# Patient Record
Sex: MALE | Race: NATIVE HAWAIIAN OR OTHER PACIFIC ISLANDER | HISPANIC OR LATINO | Employment: FULL TIME | URBAN - METROPOLITAN AREA
[De-identification: names, ages, dates, MRNs, and addresses within clinical notes are randomized per-mention and may not be internally consistent; named-entity substitution may affect disease eponyms.]

---
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
## 2019-09-10 ENCOUNTER — OFFICE VISIT (OUTPATIENT)
Dept: URGENT CARE | Facility: PHYSICIAN GROUP | Age: 19
End: 2019-09-10
Payer: COMMERCIAL

## 2019-09-10 ENCOUNTER — APPOINTMENT (OUTPATIENT)
Dept: RADIOLOGY | Facility: IMAGING CENTER | Age: 19
End: 2019-09-10
Attending: PHYSICIAN ASSISTANT
Payer: COMMERCIAL

## 2019-09-10 VITALS
HEIGHT: 72 IN | BODY MASS INDEX: 22.21 KG/M2 | TEMPERATURE: 99.2 F | SYSTOLIC BLOOD PRESSURE: 116 MMHG | DIASTOLIC BLOOD PRESSURE: 80 MMHG | RESPIRATION RATE: 16 BRPM | OXYGEN SATURATION: 99 % | HEART RATE: 86 BPM | WEIGHT: 164 LBS

## 2019-09-10 DIAGNOSIS — K59.00 CONSTIPATION, UNSPECIFIED CONSTIPATION TYPE: ICD-10-CM

## 2019-09-10 DIAGNOSIS — R06.02 SOB (SHORTNESS OF BREATH): ICD-10-CM

## 2019-09-10 PROCEDURE — 74019 RADEX ABDOMEN 2 VIEWS: CPT | Mod: TC | Performed by: PHYSICIAN ASSISTANT

## 2019-09-10 PROCEDURE — 99204 OFFICE O/P NEW MOD 45 MIN: CPT | Performed by: PHYSICIAN ASSISTANT

## 2019-09-10 PROCEDURE — 71046 X-RAY EXAM CHEST 2 VIEWS: CPT | Mod: TC | Performed by: PHYSICIAN ASSISTANT

## 2019-09-10 SDOH — HEALTH STABILITY: MENTAL HEALTH: HOW OFTEN DO YOU HAVE 6 OR MORE DRINKS ON ONE OCCASION?: MONTHLY

## 2019-09-10 SDOH — HEALTH STABILITY: MENTAL HEALTH: HOW MANY STANDARD DRINKS CONTAINING ALCOHOL DO YOU HAVE ON A TYPICAL DAY?: 3 OR 4

## 2019-09-10 SDOH — HEALTH STABILITY: MENTAL HEALTH: HOW OFTEN DO YOU HAVE A DRINK CONTAINING ALCOHOL?: 2-3 TIMES A WEEK

## 2019-09-10 NOTE — PROGRESS NOTES
"Chief Complaint   Patient presents with   • Arm Pain     left arm weakness, numbness an pain x on an off 2-3 week   • Rapid Heart Beat     used to see a heart dr said he had an irregular heart beat   • Shortness of Breath   • Constipation     this is an on going problem his whole life       HISTORY OF PRESENT ILLNESS: Patient is a 18 y.o. male who presents today for the following:    Feels like his heart is swollen; started about a year ago, went away, started again yesterday  Started while pressure washing the ground; left arm felt tight and numb; \"then I started getting this feeling around my heart\"  H/o \"extra heart beat\", cleared by cardiology 3-4 years ago  Denies rapid/slow heart beat, pounding heart beat  Does occasionally have trouble taking a deep breath but only a couple times/week  Denies cough/fever    Constipation, chronic  Seems to be worse lately  Last BM yesterday; not very much; very small pieces; not painful; felt the urge but couldn't   OTC meds tried: \"laxative\" probably once/week  Has never been evaluated for this  Has had constipation since he was a baby  Denies h/o bleeding, abdominal pain  Does report occasional rectal pain with BM     Patient Active Problem List    Diagnosis Date Noted   • Peritonsillar abscess 01/22/2017       Allergies:Patient has no known allergies.    Current Outpatient Medications Ordered in Epic   Medication Sig Dispense Refill   • amoxicillin-clavulanate (AUGMENTIN) 875-125 MG Tab Take 1 Tab by mouth 2 times a day. (Patient not taking: Reported on 9/10/2019) 20 Tab 0   • predniSONE (DELTASONE) 10 MG Tab 2 pills twice a day for 4 days (Patient not taking: Reported on 9/10/2019) 16 Tab 0   • ibuprofen (MOTRIN) 800 MG Tab Take 800 mg by mouth every 8 hours as needed.       No current Epic-ordered facility-administered medications on file.        Past Medical History:   Diagnosis Date   • Irregular heart rhythm        Social History     Tobacco Use   • Smoking status: " Never Smoker   • Smokeless tobacco: Current User     Types: Chew   Substance Use Topics   • Alcohol use: Yes     Frequency: 2-3 times a week     Drinks per session: 3 or 4     Binge frequency: Monthly   • Drug use: No       No family status information on file.   No family history on file.    Review of Systems:    Constitutional ROS: No unexpected change in weight, No weakness, No fatigue  Eye ROS: No recent significant change in vision, No eye pain, redness, discharge  Ear ROS: No drainage, No tinnitus or vertigo, No recent change in hearing  Mouth/Throat ROS: No teeth or gum problems, No bleeding gums, No tongue complaints  Neck ROS: No swollen glands, No significant pain in neck  Pulmonary ROS: Positive for shortness of breath  Cardiovascular ROS: No diaphoresis, No edema, No palpitations  Gastrointestinal ROS: Positive for constipation.  Musculoskeletal/Extremities ROS: No peripheral edema, No pain, redness or swelling on the joints  Hematologic/Lymphatic ROS: No chills, No night sweats, No weight loss  Skin/Integumentary ROS: No edema, No evidence of rash, No itching      Exam:  /80   Pulse 86   Temp 37.3 °C (99.2 °F) (Temporal)   Resp 16   Ht 1.829 m (6')   Wt 74.4 kg (164 lb)   SpO2 99%   General: Well developed, well nourished. No distress.  Pulmonary: Unlabored respiratory effort. Lungs clear to auscultation, no wheezes, no rhonchi.  No friction rub noted.  Cardiovascular: Regular rate and rhythm without murmur.  No chest wall tenderness noted.  No friction rub noted.  Extremities: No motor or sensory deficit noted.  Radial pulses are strong and equal bilaterally.  Neurologic: Grossly nonfocal. No facial asymmetry noted.  Skin: Warm, dry, good turgor. No rashes in visible areas.   Psych: Normal mood. Alert and oriented x3. Judgment and insight is normal.    EKG, per my interpretation: Normal sinus rhythm.  Normal axis.  No ST elevation/depression.    Chest x-ray, per radiology:  Impression        No acute cardiopulmonary disease.     2 view abdomen, per radiology:  Impression       1.  Increased colonic stool suggesting constipation.  2.  No evidence for bowel obstruction.     Assessment/Plan:  Chest x-ray is negative.  Suspect patient may have had a muscle strain with some possible nerve impingement causing his symptoms.  They seem to be improving.    Discussed acute versus daily medication for constipation.  Discussed dietary changes, increasing fruits and vegetables and whole grains.  Discussed importance of drinking plenty fluids and getting daily activity.  Patient verbalizes understanding and agrees to try this.  Advised he may need to see gastroenterology if symptoms do not improve.  1. Constipation, unspecified constipation type  [id number] VIEWS   2. SOB (shortness of breath)  DX-CHEST-2 VIEWS

## 2019-09-10 NOTE — LETTER
September 10, 2019         Patient: Wallace Larose   YOB: 2000   Date of Visit: 9/10/2019           To Whom it May Concern:    Wallace Larose was seen in my clinic on 9/10/2019. He may return to work on 9/11/19 without restrictions.    If you have any questions or concerns, please don't hesitate to call.        Sincerely,           Zakia Renee P.A.-C.  Electronically Signed

## 2019-09-10 NOTE — PATIENT INSTRUCTIONS
Estreñimiento - Adultos  (Constipation, Adult)  Se llama constipación cuando:  · Elimina heces (defeca) menos de 3 veces por semana.  · Tiene dificultad para defecar.  · Las heces son secas y duras o son más grandes que lo normal.  CUIDADOS EN EL HOGAR   · Consuma alimentos con alto contenido de fibra. Por ejemplo, frutas, vegetales, porotos y cereales integrales, guru el arroz integral.  · Evite los alimentos ricos en grasas y azúcar. Estos incluyen patatas fritas, hamburguesas, galletas, dulces y refrescos.  · Si no consume suficientes alimentos ricos en fibras, tome productos que tengan agregado de fibra (suplementos).  · Denia suficiente líquido para mantener el pis (orina) mendy o de color amarillo pálido.  · Lou ejercicio en forma regular, o guru lo indique ray médico.  · Vaya al baño cuando sienta la necesidad de defecar. No se aguante las ganas.  · Solo tome los medicamentos que le haya indicado ray médico. No tome medicamentos que le ayuden a defecar (laxantes) sin antes consultarlo con ray médico.  SOLICITE AYUDA DE INMEDIATO SI:   · Observa ian brillante en las heces (materia fecal).  · El estreñimiento dura más de 4 días o empeora.  · Tiene dolor en el vientre (abdominal) o el trasero (recto).  · Las heces son delgadas (guru un lápiz).  · Pierde peso de manera inexplicable.  ASEGÚRESE DE QUE:   · Comprende estas instrucciones.  · Controlará ray afección.  · Recibirá ayuda de inmediato si no mejora o si empeora.  Esta información no tiene guru fin reemplazar el consejo del médico. Asegúrese de hacerle al médico cualquier pregunta que tenga.  Document Released: 01/20/2012 Document Revised: 01/08/2016  [employer] Interactive Patient Education © 2017 [employer]  Constipation, Adult  Constipation is when a person:  · Poops (has a bowel movement) fewer times in a week than normal.  · Has a hard time pooping.  · Has poop that is dry, hard, or bigger than normal.  Follow these instructions at home:  Eating and  drinking  · Eat foods that have a lot of fiber, such as:  ¨ Fresh fruits and vegetables.  ¨ Whole grains.  ¨ Beans.  · Eat less of foods that are high in fat, low in fiber, or overly processed, such as:  ¨ French fries.  ¨ Hamburgers.  ¨ Cookies.  ¨ Candy.  ¨ Soda.  · Drink enough fluid to keep your pee (urine) clear or pale yellow.  General instructions  · Exercise regularly or as told by your doctor.  · Go to the restroom when you feel like you need to poop. Do not hold it in.  · Take over-the-counter and prescription medicines only as told by your doctor. These include any fiber supplements.  · Do pelvic floor retraining exercises, such as:  ¨ Doing deep breathing while relaxing your lower belly (abdomen).  ¨ Relaxing your pelvic floor while pooping.  · Watch your condition for any changes.  · Keep all follow-up visits as told by your doctor. This is important.  Contact a doctor if:  · You have pain that gets worse.  · You have a fever.  · You have not pooped for 4 days.  · You throw up (vomit).  · You are not hungry.  · You lose weight.  · You are bleeding from the anus.  · You have thin, pencil-like poop (stool).  Get help right away if:  · You have a fever, and your symptoms suddenly get worse.  · You leak poop or have blood in your poop.  · Your belly feels hard or bigger than normal (is bloated).  · You have very bad belly pain.  · You feel dizzy or you faint.  This information is not intended to replace advice given to you by your health care provider. Make sure you discuss any questions you have with your health care provider.  Document Released: 06/05/2009 Document Revised: 07/07/2017 Document Reviewed: 06/07/2017  [employer]vier Interactive Patient Education © 2017 [employer]

## 2020-01-06 ENCOUNTER — OFFICE VISIT (OUTPATIENT)
Dept: URGENT CARE | Facility: PHYSICIAN GROUP | Age: 20
End: 2020-01-06
Payer: COMMERCIAL

## 2020-01-06 VITALS
BODY MASS INDEX: 22.78 KG/M2 | RESPIRATION RATE: 18 BRPM | HEART RATE: 76 BPM | SYSTOLIC BLOOD PRESSURE: 126 MMHG | TEMPERATURE: 97.8 F | OXYGEN SATURATION: 98 % | DIASTOLIC BLOOD PRESSURE: 80 MMHG | WEIGHT: 168 LBS

## 2020-01-06 DIAGNOSIS — J22 LOWER RESP. TRACT INFECTION: ICD-10-CM

## 2020-01-06 PROCEDURE — 99214 OFFICE O/P EST MOD 30 MIN: CPT | Performed by: PHYSICIAN ASSISTANT

## 2020-01-06 RX ORDER — AZITHROMYCIN 250 MG/1
TABLET, FILM COATED ORAL
Qty: 6 TAB | Refills: 0 | Status: SHIPPED | OUTPATIENT
Start: 2020-01-06

## 2020-01-06 NOTE — PROGRESS NOTES
Chief Complaint   Patient presents with   • Cough     x6d        HISTORY OF PRESENT ILLNESS: Patient is a 19 y.o. male who presents today for the following:    Cough x 6 days  Denies nasal congestion  Mild SOB, chest pain with coughing  Upper back pain  Subjective fever a few days  Denies chills, body aches  OTC meds tried: cough syrup    Patient Active Problem List    Diagnosis Date Noted   • Peritonsillar abscess 01/22/2017       Allergies:Patient has no known allergies.    Current Outpatient Medications Ordered in Epic   Medication Sig Dispense Refill   • azithromycin (ZITHROMAX) 250 MG Tab Use as package directs 6 Tab 0   • amoxicillin-clavulanate (AUGMENTIN) 875-125 MG Tab Take 1 Tab by mouth 2 times a day. (Patient not taking: Reported on 9/10/2019) 20 Tab 0   • predniSONE (DELTASONE) 10 MG Tab 2 pills twice a day for 4 days (Patient not taking: Reported on 9/10/2019) 16 Tab 0   • ibuprofen (MOTRIN) 800 MG Tab Take 800 mg by mouth every 8 hours as needed.       No current Epic-ordered facility-administered medications on file.        Past Medical History:   Diagnosis Date   • Irregular heart rhythm        Social History     Tobacco Use   • Smoking status: Never Smoker   • Smokeless tobacco: Current User     Types: Chew   Substance Use Topics   • Alcohol use: Yes     Frequency: 2-3 times a week     Drinks per session: 3 or 4     Binge frequency: Monthly   • Drug use: No       No family status information on file.   History reviewed. No pertinent family history.    Review of Systems:   Constitutional ROS: No unexpected change in weight, No weakness, No fatigue  Eye ROS: No recent significant change in vision, No eye pain, redness, discharge  Ear ROS: No drainage, No tinnitus or vertigo, No recent change in hearing  Mouth/Throat ROS: No teeth or gum problems, No bleeding gums, No tongue complaints  Neck ROS: No swollen glands, No significant pain in neck  Pulmonary ROS: No chronic cough, sputum, or hemoptysis, No  dyspnea on exertion, No wheezing  Cardiovascular ROS: No diaphoresis, No edema, No palpitations  Musculoskeletal/Extremities ROS: No peripheral edema, No pain, redness or swelling on the joints  Hematologic/Lymphatic ROS: + fever, resolved  Skin/Integumentary ROS: No edema, No evidence of rash, No itching      Exam:  /80   Pulse 76   Temp 36.6 °C (97.8 °F) (Temporal)   Resp 18   Wt 76.2 kg (168 lb)   SpO2 98%   General: Well developed, well nourished. No distress.    Eye: PERRL/EOMI; conjunctivae clear, lids normal.  ENMT: Lips without lesions, MMM. Oropharynx is clear. Bilateral TMs are within normal limits.  Pulmonary: Unlabored respiratory effort. Lungs clear to auscultation, no wheezes, no rhonchi.    Cardiovascular: Regular rate and rhythm without murmur.   Neurologic: Grossly nonfocal. No facial asymmetry noted.  Lymph: No cervical lymphadenopathy noted.  Skin: Warm, dry, good turgor. No rashes in visible areas.   Psych: Normal mood. Alert and oriented to person, place and time.    Assessment/Plan:  Discussed likely viral etiology. Discussed appropriate over-the-counter symptomatic medication, and when to return to clinic.  Patient lives out of town with limited access to healthcare.   Contingent antibiotic prescription given to patient to fill upon meeting criteria of guidelines discussed. Follow up for worsening or persistent symptoms.  1. Lower resp. tract infection  azithromycin (ZITHROMAX) 250 MG Tab

## 2020-02-04 ENCOUNTER — OFFICE VISIT (OUTPATIENT)
Dept: URGENT CARE | Facility: PHYSICIAN GROUP | Age: 20
End: 2020-02-04
Payer: COMMERCIAL

## 2020-02-04 ENCOUNTER — APPOINTMENT (OUTPATIENT)
Dept: RADIOLOGY | Facility: IMAGING CENTER | Age: 20
End: 2020-02-04
Attending: PHYSICIAN ASSISTANT
Payer: COMMERCIAL

## 2020-02-04 VITALS
SYSTOLIC BLOOD PRESSURE: 122 MMHG | RESPIRATION RATE: 16 BRPM | OXYGEN SATURATION: 96 % | HEART RATE: 79 BPM | TEMPERATURE: 98.4 F | DIASTOLIC BLOOD PRESSURE: 84 MMHG | BODY MASS INDEX: 22.6 KG/M2 | HEIGHT: 72 IN | WEIGHT: 166.9 LBS

## 2020-02-04 DIAGNOSIS — R06.02 SOB (SHORTNESS OF BREATH): ICD-10-CM

## 2020-02-04 DIAGNOSIS — J22 LOWER RESP. TRACT INFECTION: ICD-10-CM

## 2020-02-04 PROCEDURE — 99214 OFFICE O/P EST MOD 30 MIN: CPT | Performed by: PHYSICIAN ASSISTANT

## 2020-02-04 RX ORDER — BENZONATATE 200 MG/1
200 CAPSULE ORAL 3 TIMES DAILY PRN
Qty: 30 CAP | Refills: 0 | Status: SHIPPED | OUTPATIENT
Start: 2020-02-04

## 2020-02-04 RX ORDER — CODEINE PHOSPHATE AND GUAIFENESIN 10; 100 MG/5ML; MG/5ML
5 SOLUTION ORAL EVERY 12 HOURS PRN
Qty: 100 ML | Refills: 0 | Status: SHIPPED | OUTPATIENT
Start: 2020-02-04 | End: 2020-02-14

## 2020-02-04 RX ORDER — DOXYCYCLINE HYCLATE 100 MG
100 TABLET ORAL 2 TIMES DAILY
Qty: 20 TAB | Refills: 0 | Status: SHIPPED | OUTPATIENT
Start: 2020-02-04

## 2020-02-04 RX ORDER — METHYLPREDNISOLONE 4 MG/1
TABLET ORAL
Qty: 21 TAB | Refills: 0 | Status: SHIPPED | OUTPATIENT
Start: 2020-02-04

## 2020-02-04 NOTE — LETTER
February 4, 2020         Patient: Wallace Larose   YOB: 2000   Date of Visit: 2/4/2020           To Whom it May Concern:    Wallace Larose was seen in my clinic on 2/4/2020. He may return to work on 2/5/2020.    If you have any questions or concerns, please don't hesitate to call.        Sincerely,           Zakia Renee P.A.-C.  Electronically Signed

## 2020-02-05 NOTE — PROGRESS NOTES
Chief Complaint   Patient presents with   • Cough     over 1 month, was seen on 1-6-2020   • Wheezing   • Shortness of Breath     with the cough       HISTORY OF PRESENT ILLNESS: Patient is a 19 y.o. male who presents today for the following:    Patient comes in for evaluation of persistent cough.  He was seen 1/6/2020 for the same, with symptoms starting over 1 month ago.  He continues to have persistent cough, causing coughing fits.  He has shortness of breath with the coughing fits and has had a difficult time sleeping because of the cough.  He denies history of asthma.  He has had only a mild runny nose but otherwise has not had any sore throat or ear pain.  He has not had any fever, night sweats, chills, body aches.    Patient Active Problem List    Diagnosis Date Noted   • Peritonsillar abscess 01/22/2017       Allergies:Patient has no known allergies.    Current Outpatient Medications Ordered in Epic   Medication Sig Dispense Refill   • doxycycline (VIBRAMYCIN) 100 MG Tab Take 1 Tab by mouth 2 times a day. ANTIBIOTIC 20 Tab 0   • methylPREDNISolone (MEDROL DOSEPAK) 4 MG Tablet Therapy Pack Use as package directs 21 Tab 0   • guaifenesin-codeine (ROBITUSSIN AC) Solution oral solution Take 5 mL by mouth every 12 hours as needed for Cough for up to 10 days. 100 mL 0   • benzonatate (TESSALON) 200 MG capsule Take 1 Cap by mouth 3 times a day as needed for Cough. 30 Cap 0   • azithromycin (ZITHROMAX) 250 MG Tab Use as package directs (Patient not taking: Reported on 2/4/2020) 6 Tab 0   • amoxicillin-clavulanate (AUGMENTIN) 875-125 MG Tab Take 1 Tab by mouth 2 times a day. (Patient not taking: Reported on 9/10/2019) 20 Tab 0   • predniSONE (DELTASONE) 10 MG Tab 2 pills twice a day for 4 days (Patient not taking: Reported on 9/10/2019) 16 Tab 0   • ibuprofen (MOTRIN) 800 MG Tab Take 800 mg by mouth every 8 hours as needed.       No current Epic-ordered facility-administered medications on file.        Past Medical  History:   Diagnosis Date   • Irregular heart rhythm        Social History     Tobacco Use   • Smoking status: Never Smoker   • Smokeless tobacco: Current User     Types: Chew   Substance Use Topics   • Alcohol use: Yes     Frequency: 2-3 times a week     Drinks per session: 3 or 4     Binge frequency: Monthly   • Drug use: No       No family status information on file.   History reviewed. No pertinent family history.    Review of Systems:   Constitutional ROS: No unexpected change in weight, No weakness, No fatigue  Eye ROS: No recent significant change in vision, No eye pain, redness, discharge  Ear ROS: No drainage, No tinnitus or vertigo, No recent change in hearing  Mouth/Throat ROS: No teeth or gum problems, No bleeding gums, No tongue complaints  Neck ROS: No swollen glands, No significant pain in neck  Pulmonary ROS: Positive for shortness of breath.  Cardiovascular ROS: No diaphoresis, No edema, No palpitations  Musculoskeletal/Extremities ROS: No peripheral edema, No pain, redness or swelling on the joints  Hematologic/Lymphatic ROS: No chills, No night sweats, No weight loss  Skin/Integumentary ROS: No edema, No evidence of rash, No itching      Exam:  /84   Pulse 79   Temp 36.9 °C (98.4 °F) (Temporal)   Resp 16   Ht 1.829 m (6')   Wt 75.7 kg (166 lb 14.4 oz)   SpO2 96%   General: Well developed, well nourished. No distress.    Eye: PERRL/EOMI; conjunctivae clear, lids normal.  ENMT: Lips without lesions, MMM. Oropharynx is clear. Bilateral TMs are within normal limits.  Pulmonary: Unlabored respiratory effort. Lungs clear to auscultation, no wheezes, no rhonchi.    Cardiovascular: Regular rate and rhythm without murmur.   Neurologic: Grossly nonfocal. No facial asymmetry noted.  Lymph: No cervical lymphadenopathy noted.  Skin: Warm, dry, good turgor. No rashes in visible areas.   Psych: Normal mood. Alert and oriented to person, place and time.    Assessment/Plan:  Discussed with patient that  this is possibly due to a virus.  We will have patient start steroids for the shortness of breath and have provided contingent antibiotics for any worsening symptoms.  Chest x-ray to be performed as an outpatient if symptoms do not improve with the steroids and antibiotics.  Discussed appropriate over-the-counter symptomatic medication, and when to return to clinic. Follow up for worsening or persistent symptoms.  1. Lower resp. tract infection  methylPREDNISolone (MEDROL DOSEPAK) 4 MG Tablet Therapy Pack    guaifenesin-codeine (ROBITUSSIN AC) Solution oral solution   2. SOB (shortness of breath)  doxycycline (VIBRAMYCIN) 100 MG Tab    DX-CHEST-2 VIEWS    benzonatate (TESSALON) 200 MG capsule

## 2025-01-02 ENCOUNTER — APPOINTMENT (OUTPATIENT)
Dept: RADIOLOGY | Facility: IMAGING CENTER | Age: 25
End: 2025-01-02
Attending: NURSE PRACTITIONER
Payer: COMMERCIAL

## 2025-01-02 ENCOUNTER — OFFICE VISIT (OUTPATIENT)
Dept: URGENT CARE | Facility: PHYSICIAN GROUP | Age: 25
End: 2025-01-02
Payer: COMMERCIAL

## 2025-01-02 VITALS
HEIGHT: 71 IN | SYSTOLIC BLOOD PRESSURE: 126 MMHG | BODY MASS INDEX: 28.87 KG/M2 | HEART RATE: 101 BPM | DIASTOLIC BLOOD PRESSURE: 82 MMHG | WEIGHT: 206.2 LBS | TEMPERATURE: 98.1 F | RESPIRATION RATE: 16 BRPM | OXYGEN SATURATION: 99 %

## 2025-01-02 DIAGNOSIS — R14.0 ABDOMINAL BLOATING: ICD-10-CM

## 2025-01-02 DIAGNOSIS — R10.84 GENERALIZED ABDOMINAL PAIN: ICD-10-CM

## 2025-01-02 DIAGNOSIS — K59.00 CONSTIPATION, UNSPECIFIED CONSTIPATION TYPE: ICD-10-CM

## 2025-01-02 DIAGNOSIS — R19.5 ABNORMAL FINDINGS IN STOOL: ICD-10-CM

## 2025-01-02 PROCEDURE — 99204 OFFICE O/P NEW MOD 45 MIN: CPT | Performed by: NURSE PRACTITIONER

## 2025-01-02 PROCEDURE — 3079F DIAST BP 80-89 MM HG: CPT | Performed by: NURSE PRACTITIONER

## 2025-01-02 PROCEDURE — 74019 RADEX ABDOMEN 2 VIEWS: CPT | Mod: TC,FY | Performed by: NURSE PRACTITIONER

## 2025-01-02 PROCEDURE — 3074F SYST BP LT 130 MM HG: CPT | Performed by: NURSE PRACTITIONER

## 2025-01-02 RX ORDER — HYDROXYZINE HYDROCHLORIDE 25 MG/1
25 TABLET, FILM COATED ORAL 3 TIMES DAILY PRN
COMMUNITY

## 2025-01-02 ASSESSMENT — ENCOUNTER SYMPTOMS
ABDOMINAL PAIN: 1
CONSTIPATION: 1

## 2025-01-02 NOTE — PROGRESS NOTES
"Subjective:     Wallace Larose is a 24 y.o. male who presents for Constipation (Constipated for few weeks. Noticed lump on abdomen 2 days ago. /Last BM yesterday, been going 2-3 days without BM), Abdominal Pain, Gas, and Bloated      Constipation  Associated symptoms include abdominal pain.   Abdominal Pain  Associated symptoms include constipation.     Pt presents for evaluation of a new problem.  Brayan is a very pleasant 24-year-old male who presents to urgent care today with complaints of ongoing symptoms of constipation.  He states that this has been ongoing for the past few months.  He denies any pain after eating meals.  Associated symptoms include heartburn and rectal pain.  Negative for blood in stool.  He notes he will have a bowel movement every 2 to 3 days.  This does wax and wane with use of laxatives and stool softeners.  He notes that when he does use over-the-counter stool softeners and laxatives he will have watery diarrhea.  Otherwise, patient complains of ribbon like stool.  Use of over-the-counter laxatives/stool softeners do not seem to alleviate his bloating and constipation feeling.  No previous colonoscopies.    Review of Systems   Gastrointestinal:  Positive for abdominal pain and constipation.       PMH:   Past Medical History:   Diagnosis Date    Irregular heart rhythm      ALLERGIES: No Known Allergies  SURGHX: No past surgical history on file.  SOCHX:   Social History     Socioeconomic History    Marital status: Single   Tobacco Use    Smoking status: Never    Smokeless tobacco: Current     Types: Chew   Substance and Sexual Activity    Alcohol use: Yes    Drug use: No     FH: No family history on file.      Objective:   /82   Pulse (!) 101   Temp 36.7 °C (98.1 °F) (Temporal)   Resp 16   Ht 1.803 m (5' 11\")   Wt 93.5 kg (206 lb 3.2 oz)   SpO2 99%   BMI 28.76 kg/m²     Physical Exam  Vitals and nursing note reviewed.   Constitutional:       General: He is not in acute " distress.     Appearance: Normal appearance. He is normal weight. He is not ill-appearing or toxic-appearing.   HENT:      Head: Normocephalic.      Right Ear: Tympanic membrane, ear canal and external ear normal.      Left Ear: Tympanic membrane, ear canal and external ear normal.      Nose: Nose normal.      Mouth/Throat:      Mouth: Mucous membranes are moist.   Eyes:      General:         Right eye: No discharge.         Left eye: No discharge.      Extraocular Movements: Extraocular movements intact.      Conjunctiva/sclera: Conjunctivae normal.      Pupils: Pupils are equal, round, and reactive to light.   Cardiovascular:      Rate and Rhythm: Normal rate and regular rhythm.   Pulmonary:      Effort: Pulmonary effort is normal.      Breath sounds: Normal breath sounds.   Abdominal:      General: Abdomen is flat.      Tenderness: There is abdominal tenderness in the left lower quadrant.   Genitourinary:     Rectum: No external hemorrhoid or internal hemorrhoid. Normal anal tone.   Musculoskeletal:         General: Normal range of motion.      Cervical back: Normal range of motion and neck supple. No rigidity.   Lymphadenopathy:      Cervical: No cervical adenopathy.   Skin:     General: Skin is warm and dry.   Neurological:      General: No focal deficit present.      Mental Status: He is alert and oriented to person, place, and time. Mental status is at baseline.   Psychiatric:         Mood and Affect: Mood normal.         Behavior: Behavior normal.         Judgment: Judgment normal.       [id number] VIEWS    Result Date: 1/2/2025 1/2/2025 3:12 PM HISTORY/REASON FOR EXAM: Abdominal pain and constipation. TECHNIQUE/EXAM DESCRIPTION AND NUMBER OF VIEWS: 2 supine views of the abdomen. COMPARISON: None FINDINGS: There is no evidence of bowel obstruction. There is mild constipation. No abnormal calcifications are seen.     Mild constipation. No evidence of bowel obstruction.     Assessment/Plan:   Assessment     1. Constipation, unspecified constipation type  [id number] VIEWS    CT-ABDOMEN-PELVIS WITH    Referral to Gastroenterology      2. Abnormal findings in stool  [id number] VIEWS    Referral to Gastroenterology      3. Abdominal bloating  [id number] VIEWS    CT-ABDOMEN-PELVIS WITH    Referral to Gastroenterology      4. Generalized abdominal pain  CT-ABDOMEN-PELVIS WITH    Referral to Gastroenterology        X-ray findings discussed with patient.  CT abdomen pelvis ordered for further evaluation of abdominal discomfort, bloating and feeling of constipation x 3 months.  Referral given to follow-up with gastroenterology for further complaint of constipation and ribbon like stool.  No evidence of hemorrhoids on exam today.

## 2025-01-13 ENCOUNTER — HOSPITAL ENCOUNTER (OUTPATIENT)
Dept: RADIOLOGY | Facility: MEDICAL CENTER | Age: 25
End: 2025-01-13
Attending: NURSE PRACTITIONER
Payer: COMMERCIAL

## 2025-01-13 DIAGNOSIS — R14.0 ABDOMINAL BLOATING: ICD-10-CM

## 2025-01-13 DIAGNOSIS — K59.00 CONSTIPATION, UNSPECIFIED CONSTIPATION TYPE: ICD-10-CM

## 2025-01-13 DIAGNOSIS — R10.84 GENERALIZED ABDOMINAL PAIN: ICD-10-CM

## 2025-01-13 PROCEDURE — 700117 HCHG RX CONTRAST REV CODE 255: Performed by: NURSE PRACTITIONER

## 2025-01-13 PROCEDURE — 74177 CT ABD & PELVIS W/CONTRAST: CPT

## 2025-01-13 RX ADMIN — IOHEXOL 100 ML: 350 INJECTION, SOLUTION INTRAVENOUS at 14:12

## 2025-01-21 ENCOUNTER — TELEPHONE (OUTPATIENT)
Dept: URGENT CARE | Facility: PHYSICIAN GROUP | Age: 25
End: 2025-01-21
Payer: COMMERCIAL